# Patient Record
Sex: MALE | Race: WHITE | URBAN - METROPOLITAN AREA
[De-identification: names, ages, dates, MRNs, and addresses within clinical notes are randomized per-mention and may not be internally consistent; named-entity substitution may affect disease eponyms.]

---

## 2019-02-07 ENCOUNTER — OFFICE VISIT (OUTPATIENT)
Dept: URBAN - METROPOLITAN AREA CLINIC 17 | Facility: CLINIC | Age: 34
End: 2019-02-07
Payer: COMMERCIAL

## 2019-02-07 DIAGNOSIS — H01.021 SQUAMOUS BLEPHARITIS RIGHT UPPER EYELID: Primary | Chronic | ICD-10-CM

## 2019-02-07 PROCEDURE — 92012 INTRM OPH EXAM EST PATIENT: CPT | Performed by: OPHTHALMOLOGY

## 2019-02-07 ASSESSMENT — INTRAOCULAR PRESSURE
OS: 11
OD: 12

## 2019-02-07 NOTE — IMPRESSION/PLAN
Impression: Squamous blepharitis right upper eyelid: H01.021. OD. Condition: established, stable. Symptoms: will continue to monitor. Vision: vision not affected. Plan: Discussed diagnosis in detail with patient. Discussed treatment options with patient. Apply hot compress for 3-5 minutes followed by firm upward massage to lower eyelids, and firm downward massage to upper eyelids. Surgery NOT warranted, only mild swelling of right upper eyelid is noted today.